# Patient Record
Sex: FEMALE | Race: WHITE
[De-identification: names, ages, dates, MRNs, and addresses within clinical notes are randomized per-mention and may not be internally consistent; named-entity substitution may affect disease eponyms.]

---

## 2018-11-01 ENCOUNTER — HOSPITAL ENCOUNTER (INPATIENT)
Dept: HOSPITAL 91 - REC | Age: 63
LOS: 1 days | Discharge: HOME | DRG: 483 | End: 2018-11-02
Payer: COMMERCIAL

## 2018-11-01 ENCOUNTER — HOSPITAL ENCOUNTER (INPATIENT)
Age: 63
LOS: 1 days | Discharge: HOME | DRG: 483 | End: 2018-11-02

## 2018-11-01 DIAGNOSIS — I10: ICD-10-CM

## 2018-11-01 DIAGNOSIS — M65.812: ICD-10-CM

## 2018-11-01 DIAGNOSIS — M25.612: ICD-10-CM

## 2018-11-01 DIAGNOSIS — M19.012: Primary | ICD-10-CM

## 2018-11-01 DIAGNOSIS — F17.210: ICD-10-CM

## 2018-11-01 DIAGNOSIS — M66.812: ICD-10-CM

## 2018-11-01 DIAGNOSIS — E03.9: ICD-10-CM

## 2018-11-01 DIAGNOSIS — E78.5: ICD-10-CM

## 2018-11-01 PROCEDURE — 86999 UNLISTED TRANSFUSION MED PX: CPT

## 2018-11-01 PROCEDURE — 97166 OT EVAL MOD COMPLEX 45 MIN: CPT

## 2018-11-01 PROCEDURE — 0LS40ZZ REPOSITION LEFT UPPER ARM TENDON, OPEN APPROACH: ICD-10-PCS

## 2018-11-01 PROCEDURE — 73030 X-RAY EXAM OF SHOULDER: CPT

## 2018-11-01 PROCEDURE — 0RRK0JZ REPLACEMENT OF LEFT SHOULDER JOINT WITH SYNTHETIC SUBSTITUTE, OPEN APPROACH: ICD-10-PCS

## 2018-11-01 RX ADMIN — PREGABALIN 1 MG: 25 CAPSULE ORAL at 20:19

## 2018-11-01 RX ADMIN — CEFAZOLIN SODIUM 1 MLS/HR: 2 SOLUTION INTRAVENOUS at 11:09

## 2018-11-01 RX ADMIN — BACITRACIN 1 ML: 50000 INJECTION, POWDER, FOR SOLUTION INTRAMUSCULAR at 07:13

## 2018-11-01 RX ADMIN — ACETAMINOPHEN 1 MG: 500 TABLET, FILM COATED ORAL at 20:19

## 2018-11-01 RX ADMIN — DOCUSATE SODIUM AND SENNOSIDES 1 TAB: 8.6; 5 TABLET, FILM COATED ORAL at 20:33

## 2018-11-01 RX ADMIN — CEFAZOLIN 1 MLS/HR: 1 INJECTION, POWDER, FOR SOLUTION INTRAMUSCULAR; INTRAVENOUS at 11:14

## 2018-11-01 RX ADMIN — BUPIVACAINE HYDROCHLORIDE 1 ML: 5 INJECTION, SOLUTION EPIDURAL; INTRACAUDAL; PERINEURAL at 11:09

## 2018-11-01 RX ADMIN — GABAPENTIN 1 MG: 300 CAPSULE ORAL at 07:36

## 2018-11-01 RX ADMIN — CALCIUM CHLORIDE 1 MG: 100 INJECTION INTRAVENOUS; INTRAVENTRICULAR at 07:13

## 2018-11-01 RX ADMIN — CEFAZOLIN 1 MLS/HR: 1 INJECTION, POWDER, FOR SOLUTION INTRAMUSCULAR; INTRAVENOUS at 18:53

## 2018-11-01 RX ADMIN — ATORVASTATIN CALCIUM 1 MG: 10 TABLET, FILM COATED ORAL at 20:33

## 2018-11-01 RX ADMIN — ACETAMINOPHEN 1 MG: 500 TABLET, FILM COATED ORAL at 14:53

## 2018-11-01 RX ADMIN — MEPERIDINE HYDROCHLORIDE 1 MG: 25 INJECTION, SOLUTION INTRAMUSCULAR; INTRAVENOUS; SUBCUTANEOUS at 11:29

## 2018-11-01 RX ADMIN — TRANEXAMIC ACID 1 MLS/HR: 100 INJECTION, SOLUTION INTRAVENOUS at 11:09

## 2018-11-01 RX ADMIN — THROMBIN TOPICAL RECOMBINANT 1 UNITS: KIT at 07:13

## 2018-11-01 RX ADMIN — PYRIDOXINE HYDROCHLORIDE 1 MLS/HR: 100 INJECTION, SOLUTION INTRAMUSCULAR; INTRAVENOUS at 06:00

## 2018-11-01 RX ADMIN — OXYCODONE HYDROCHLORIDE AND ACETAMINOPHEN 1 TAB: 5; 325 TABLET ORAL at 20:19

## 2018-11-01 RX ADMIN — TRANEXAMIC ACID 1 MLS/HR: 100 INJECTION, SOLUTION INTRAVENOUS at 11:23

## 2018-11-02 RX ADMIN — MORPHINE SULFATE 1 MG: 2 INJECTION, SOLUTION INTRAMUSCULAR; INTRAVENOUS at 00:30

## 2018-11-02 RX ADMIN — MORPHINE SULFATE 1 MG: 2 INJECTION, SOLUTION INTRAMUSCULAR; INTRAVENOUS at 04:26

## 2018-11-02 RX ADMIN — OXYCODONE HYDROCHLORIDE AND ACETAMINOPHEN 1 TAB: 5; 325 TABLET ORAL at 02:03

## 2018-11-02 RX ADMIN — OXYCODONE HYDROCHLORIDE AND ACETAMINOPHEN 1 TAB: 5; 325 TABLET ORAL at 07:35

## 2018-11-02 RX ADMIN — CEFAZOLIN 1 MLS/HR: 1 INJECTION, POWDER, FOR SOLUTION INTRAMUSCULAR; INTRAVENOUS at 02:02

## 2018-11-02 RX ADMIN — SERTRALINE 1 MG: 100 TABLET, FILM COATED ORAL at 08:22

## 2018-11-02 RX ADMIN — ESTROGENS, CONJUGATED 1 MG: 0.62 TABLET, FILM COATED ORAL at 08:22

## 2018-11-02 RX ADMIN — PYRIDOXINE HYDROCHLORIDE 1 MLS/HR: 100 INJECTION, SOLUTION INTRAMUSCULAR; INTRAVENOUS at 06:00

## 2018-11-02 RX ADMIN — LEVOTHYROXINE SODIUM 1 MCG: 150 TABLET ORAL at 07:00

## 2018-11-02 RX ADMIN — PREGABALIN 1 MG: 25 CAPSULE ORAL at 08:21

## 2018-11-02 RX ADMIN — DOCUSATE SODIUM AND SENNOSIDES 1 TAB: 8.6; 5 TABLET, FILM COATED ORAL at 08:22

## 2019-07-31 NOTE — HP
DATE OF ADMISSION: 08/01/2019

 

HISTORY OF PRESENT ILLNESS:  This is a 63-year-old female patient with a long history of chronic sinu
sitis, dating back several months, treated with multiple courses of antibiotics without relief seen i
n the office 06/2019 for initial evaluation which demonstrated chronic sinusitis.  CT scan of the sin
uses was taken at that time demonstrating pan- left sinusitis.  The patient has been unresponsive to 
medication, now admitted to the hospital for corrective sinus surgery.

 

ALLERGIES: 

1.  SULFA.

2.  PENICILLIN.

 

MEDICAL CONDITIONS:  Thyroid, chronic sinusitis.

 

MEDICATIONS:

1.  Premarin.

2.  Atorvastatin.

3.  Sertraline.  

4.  Levothyroxine.   

 

PAST SURGICAL HISTORY:  Left tonsillectomy, hysterectomy, cholecystectomy.

 

CLOTTING DISORDERS:  None.

 

HABITS:  Alcohol:  Social.  Tobacco:  Social.  Recreational drugs:  None.

 

FAMILY HISTORY:  Negative. 

 

REVIEW OF SYSTEMS:  Negative.

 

PHYSICAL EXAMINATION:

GENERAL:  Well-developed, well-nourished female patient in no acute distress.

HEAD:  Normocephalic.  No masses or deformities.

EARS:  Ears and tympanic membranes are normal.

NOSE:  Left septal deviation with polyps noted on the left side.  

OROPHARYNX:  Clear.

NECK:  No masses or adenopathy.

CHEST:  Clear to P and A.

HEART:  Regular sinus rhythm without murmur.

ABDOMEN:  Soft, bowel sounds normal.  No masses or megaly.

EXTREMITIES:  Full range of motion without deformity.

NEUROLOGIC:  Physiologic.

PELVIC AND RECTAL:  Not done.

 

IMPRESSION:  Chronic sinusitis, septal deviation with polyposis.

 

RECOMMENDATIONS:  Admit for surgery.

 

 

Dictated By: MAK RAYMOND/CELSA

DD:    07/31/2019 13:11:04

DT:    07/31/2019 15:50:14

Conf#: [id number]

DID#:  [id number]

## 2019-08-01 ENCOUNTER — HOSPITAL ENCOUNTER (OUTPATIENT)
Dept: HOSPITAL 10 - SDS | Age: 64
Discharge: HOME | End: 2019-08-01
Attending: OTOLARYNGOLOGY
Payer: COMMERCIAL

## 2019-08-01 ENCOUNTER — HOSPITAL ENCOUNTER (OUTPATIENT)
Dept: HOSPITAL 91 - SDS | Age: 64
Discharge: HOME | End: 2019-08-01
Payer: COMMERCIAL

## 2019-08-01 VITALS — HEART RATE: 112 BPM | SYSTOLIC BLOOD PRESSURE: 140 MMHG | RESPIRATION RATE: 20 BRPM | DIASTOLIC BLOOD PRESSURE: 86 MMHG

## 2019-08-01 VITALS — HEART RATE: 106 BPM | RESPIRATION RATE: 20 BRPM | DIASTOLIC BLOOD PRESSURE: 82 MMHG | SYSTOLIC BLOOD PRESSURE: 150 MMHG

## 2019-08-01 VITALS — SYSTOLIC BLOOD PRESSURE: 137 MMHG | HEART RATE: 86 BPM | DIASTOLIC BLOOD PRESSURE: 73 MMHG | RESPIRATION RATE: 20 BRPM

## 2019-08-01 VITALS — HEART RATE: 94 BPM | SYSTOLIC BLOOD PRESSURE: 124 MMHG | RESPIRATION RATE: 20 BRPM | DIASTOLIC BLOOD PRESSURE: 81 MMHG

## 2019-08-01 VITALS — HEART RATE: 100 BPM | DIASTOLIC BLOOD PRESSURE: 81 MMHG | SYSTOLIC BLOOD PRESSURE: 95 MMHG | RESPIRATION RATE: 18 BRPM

## 2019-08-01 VITALS — RESPIRATION RATE: 18 BRPM | DIASTOLIC BLOOD PRESSURE: 81 MMHG | SYSTOLIC BLOOD PRESSURE: 140 MMHG | HEART RATE: 96 BPM

## 2019-08-01 VITALS — SYSTOLIC BLOOD PRESSURE: 156 MMHG | HEART RATE: 83 BPM | RESPIRATION RATE: 19 BRPM | DIASTOLIC BLOOD PRESSURE: 83 MMHG

## 2019-08-01 VITALS — SYSTOLIC BLOOD PRESSURE: 129 MMHG | DIASTOLIC BLOOD PRESSURE: 83 MMHG | HEART RATE: 118 BPM | RESPIRATION RATE: 20 BRPM

## 2019-08-01 VITALS — SYSTOLIC BLOOD PRESSURE: 140 MMHG | HEART RATE: 82 BPM | DIASTOLIC BLOOD PRESSURE: 78 MMHG | RESPIRATION RATE: 18 BRPM

## 2019-08-01 VITALS — DIASTOLIC BLOOD PRESSURE: 71 MMHG | HEART RATE: 86 BPM | RESPIRATION RATE: 18 BRPM | SYSTOLIC BLOOD PRESSURE: 135 MMHG

## 2019-08-01 VITALS — DIASTOLIC BLOOD PRESSURE: 90 MMHG | RESPIRATION RATE: 18 BRPM | SYSTOLIC BLOOD PRESSURE: 128 MMHG | HEART RATE: 92 BPM

## 2019-08-01 VITALS — HEART RATE: 90 BPM | RESPIRATION RATE: 18 BRPM | DIASTOLIC BLOOD PRESSURE: 84 MMHG | SYSTOLIC BLOOD PRESSURE: 133 MMHG

## 2019-08-01 VITALS — HEART RATE: 102 BPM | RESPIRATION RATE: 18 BRPM | SYSTOLIC BLOOD PRESSURE: 146 MMHG | DIASTOLIC BLOOD PRESSURE: 69 MMHG

## 2019-08-01 VITALS — DIASTOLIC BLOOD PRESSURE: 72 MMHG | SYSTOLIC BLOOD PRESSURE: 134 MMHG | HEART RATE: 72 BPM | RESPIRATION RATE: 16 BRPM

## 2019-08-01 VITALS — HEART RATE: 86 BPM | RESPIRATION RATE: 20 BRPM | SYSTOLIC BLOOD PRESSURE: 142 MMHG | DIASTOLIC BLOOD PRESSURE: 73 MMHG

## 2019-08-01 VITALS — RESPIRATION RATE: 18 BRPM | DIASTOLIC BLOOD PRESSURE: 79 MMHG | HEART RATE: 96 BPM | SYSTOLIC BLOOD PRESSURE: 139 MMHG

## 2019-08-01 VITALS — RESPIRATION RATE: 20 BRPM | HEART RATE: 92 BPM | DIASTOLIC BLOOD PRESSURE: 56 MMHG | SYSTOLIC BLOOD PRESSURE: 116 MMHG

## 2019-08-01 VITALS — RESPIRATION RATE: 18 BRPM | DIASTOLIC BLOOD PRESSURE: 65 MMHG | SYSTOLIC BLOOD PRESSURE: 129 MMHG | HEART RATE: 96 BPM

## 2019-08-01 VITALS — SYSTOLIC BLOOD PRESSURE: 145 MMHG | RESPIRATION RATE: 18 BRPM | HEART RATE: 86 BPM | DIASTOLIC BLOOD PRESSURE: 107 MMHG

## 2019-08-01 VITALS
WEIGHT: 133.38 LBS | BODY MASS INDEX: 25.18 KG/M2 | WEIGHT: 133.38 LBS | HEIGHT: 61 IN | HEIGHT: 61 IN | BODY MASS INDEX: 25.18 KG/M2

## 2019-08-01 DIAGNOSIS — J32.9: Primary | ICD-10-CM

## 2019-08-01 DIAGNOSIS — E03.9: ICD-10-CM

## 2019-08-01 DIAGNOSIS — J34.3: ICD-10-CM

## 2019-08-01 DIAGNOSIS — F32.9: ICD-10-CM

## 2019-08-01 DIAGNOSIS — E78.5: ICD-10-CM

## 2019-08-01 PROCEDURE — 30520 REPAIR OF NASAL SEPTUM: CPT

## 2019-08-01 PROCEDURE — 88304 TISSUE EXAM BY PATHOLOGIST: CPT

## 2019-08-01 PROCEDURE — 30140 RESECT INFERIOR TURBINATE: CPT

## 2019-08-01 PROCEDURE — 31090 EXPLORATION OF SINUSES: CPT

## 2019-08-01 RX ADMIN — LIDOCAINE HYDROCHLORIDE 1 ML: 10; .005 INJECTION, SOLUTION EPIDURAL; INFILTRATION; INTRACAUDAL; PERINEURAL at 13:53

## 2019-08-01 RX ADMIN — HYDROMORPHONE HYDROCHLORIDE PRN MG: 2 INJECTION INTRAMUSCULAR; INTRAVENOUS; SUBCUTANEOUS at 13:55

## 2019-08-01 RX ADMIN — HYDROMORPHONE HYDROCHLORIDE 1 MG: 2 INJECTION INTRAMUSCULAR; INTRAVENOUS; SUBCUTANEOUS at 15:09

## 2019-08-01 RX ADMIN — HYDROMORPHONE HYDROCHLORIDE PRN MG: 2 INJECTION INTRAMUSCULAR; INTRAVENOUS; SUBCUTANEOUS at 15:09

## 2019-08-01 RX ADMIN — HYDROMORPHONE HYDROCHLORIDE 1 MG: 2 INJECTION INTRAMUSCULAR; INTRAVENOUS; SUBCUTANEOUS at 13:55

## 2019-08-01 RX ADMIN — ACETAMINOPHEN 1 MG: 500 TABLET, FILM COATED ORAL at 14:35

## 2019-08-01 RX ADMIN — HYDROMORPHONE HYDROCHLORIDE 1 MG: 2 INJECTION INTRAMUSCULAR; INTRAVENOUS; SUBCUTANEOUS at 15:38

## 2019-08-01 RX ADMIN — COCAINE HYDROCHLORIDE 1 ML: 40 SOLUTION NASAL at 13:53

## 2019-08-01 NOTE — PREAC
Date/Time of Note


Date/Time of Note


DATE: 8/1/19 


TIME: 12:09





Anesthesia Eval and Record


Evaluation


Time Pre-Procedure Interview


DATE: 8/1/19 


TIME: 12:09


Age


63


Sex


female


NPO:  8 hrs


Preoperative diagnosis


chronic sinusitis


Planned procedure


endoscopic sinus surgery





Past Medical History


Past Medical History:  Includes


Cardio:  Dyslipidemia


Endo:  Hypothyroid


Psych:  Depression





Surgery & Anesthesia Issues


No known issue





Meds


Anticoagulation:  No


Beta Blocker within 24 hr:  No


Reason Beta Blocker not given:  Pt. not on B-Blocker


Reported Medications


Estrogens Conjugated* (Premarin*) 0.625 Mg Tab, 0.625 MG PO DAILY, TAB


   11/1/18


Levothyroxine Sodium* (Levoxyl*) 150 Mcg Tablet, 150 MCG PO BEFORE BREAKFAST, 


#30 TAB


   11/1/18


Atorvastatin Calcium (Atorvastatin Calcium) 10 Mg Tablet, 10 MG PO QHS, #30 TAB


   11/1/18


Discontinued Reported Medications


Pregabalin* (Lyrica*) 50 Mg Capsule, 50 MG PO BID, CAP


   11/1/18


Sertraline Hcl* (Sertraline Hcl*) 100 Mg Tablet, 100 MG PO DAILY, #30 TAB


   11/1/18


Meds reviewed:  Yes





Allergies


Coded Allergies:  


     Sulfa (Sulfonamide Antibiotics) (Verified  Adverse Reaction, Unknown, 


8/1/19)


     codeine (Verified  Adverse Reaction, Unknown, 8/1/19)


Allergies Reviewed:  Yes





Labs/Studies


Labs Reviewed:  Reviewed by anesthesiologist


Pregnancy test:  N/A





Pre-procedure Exam


Last vitals





Vital Signs


  Date      Temp  Pulse  Resp  B/P (MAP)   Pulse Ox  O2          O2 Flow    FiO2


Time                                                 Delivery    Rate


    8/1/19  97.7     72    16      134/72       100  Room Air


     09:57                           (92)





Airway:  Adequate mouth opening, Adequate thyromental dist


Mallampati:  Mallampati III


Teeth:  Normal


Lung:  Normal


Heart:  Normal





ASA Physical Status


ASA physical status:  2


Emergency:  None





Pre-operative Attestations


Prior to commencing anesthesia and surgery, the patient was re-evaluated, there 


was verification of:


*The patient's identity


*The results of appropriate recent lab work and preoperative vital signs


*The above evaluation not changing prior to induction


*Anesthetic plan, risk benefits, alternative and complications discussed with 


patient/family; questions answered; patient/family understands, accepts and 


wishes to proceed.











BILLY MATTHEWS DO              Aug 1, 2019 12:10

## 2019-08-01 NOTE — PAC
Date/Time of Note


Date/Time of Note


DATE: 8/1/19 


TIME: 13:42





Post-Anesthesia Notes


Post-Anesthesia Note


Last documented vital signs





Vital Signs


  Date      Temp  Pulse  Resp  B/P (MAP)  Pulse Ox  O2          O2 Flow     FiO2


Time                                                Delivery    Rate


    8/1/19    99     95    18     139/79       100  Room Air


      1342





Activity:  WNL


Respiratory function:  WNL


Cardiovascular function:  WNL


Mental status:  Baseline


Pain reasonably controlled:  Yes


Hydration appropriate:  Yes


Nausea/Vomiting absent:  Yes











BILLY MATTHEWS DO              Aug 1, 2019 13:42

## 2019-08-02 NOTE — OPR
DATE OF OPERATION:  08/01/2019

 

 

PREOPERATIVE DIAGNOSIS:  Septal deviation, turbinate hypertrophy, polyposis.

 

POSTOPERATIVE DIAGNOSIS:  Chronic sinusitis.

 

PROCEDURE PERFORMED:  Septoplasty, turbinate reduction, left nasal polypectomy, left endoscopic maxil
breanna sinus surgery, left endoscopic anterior and posterior ethmoid endoscopic sinus surgery, left end
oscopic frontal sinus surgery.

 

DESCRIPTION OF PROCEDURE:  The patient was brought to the operating room under parenteral sedation, g
eneral oral endotracheal anesthesia with the patient in the supine position, sterile sheets and drape
s applied.  Nose anesthetized topically with 5% cottonoid cocaine and injectable Xylocaine 1% epineph
rine 1:100,000.  The inferior turbinate bones were lightly crushed and outfractured.  Polyps were rem
addy from the left middle meatus.  The septum was deviated to the left.  A left hemitransfixion incis
ion was made.  Septal flaps were elevated exposing the bony and cartilaginous septums.  The quadrilat
eral was detached from the crest of the premaxilla and portions were removed and sent for pathologic 
evaluation.  The bony septum was deviated to the left and was mobilized with mallet and chisel and re
moved with forceps.  The septal compartment was then suctioned, the incision closed with interrupted 
4-0 chromic.  Attention then turned to the left middle meatus.  The middle turbinate was medialized e
xposing the middle meatus.  Polyps were removed from the anterior and posterior ethmoids.  The uncina
te process was removed.  A small accessory left maxillary sinus ostium was noted.  Using forward biti
ng forceps, this was enlarged to encompass the natural ostium.  It was enlarged anteriorly with a dao
kbiting forceps.  The maxillary sinus on the left was then irrigated of thick mucoid material and the
n curetted for polyps.  The frontal ostium was then identified and opened with upbiting forceps.  Thi
s completed the left maxillary procedure.  The nose was then suctioned.  The sinus cavity on the left
 and the nasal passage on the right were packed with bacitracin impregnated Nasopore sponge.  A drip 
pad was applied.  The patient was awakened and extubated in the operating room and returned to [facility] in excellent condition.

 

ESTIMATED BLOOD LOSS:  5 to 10 mL.

 

COMPLICATIONS:  None.

 

 

Dictated By: MAK RAYMOND/CELSA

DD:    08/02/2019 11:35:42

DT:    08/02/2019 14:14:43

Conf#: [id number]

DID#:  [id number]